# Patient Record
Sex: MALE | Race: WHITE | NOT HISPANIC OR LATINO | URBAN - METROPOLITAN AREA
[De-identification: names, ages, dates, MRNs, and addresses within clinical notes are randomized per-mention and may not be internally consistent; named-entity substitution may affect disease eponyms.]

---

## 2021-03-02 ENCOUNTER — TELEPHONE ENCOUNTER (OUTPATIENT)
Dept: URBAN - METROPOLITAN AREA CLINIC 23 | Facility: CLINIC | Age: 61
End: 2021-03-02

## 2021-04-05 ENCOUNTER — LAB OUTSIDE AN ENCOUNTER (OUTPATIENT)
Dept: URBAN - NONMETROPOLITAN AREA CLINIC 2 | Facility: CLINIC | Age: 61
End: 2021-04-05

## 2021-04-05 ENCOUNTER — OFFICE VISIT (OUTPATIENT)
Dept: URBAN - NONMETROPOLITAN AREA CLINIC 2 | Facility: CLINIC | Age: 61
End: 2021-04-05
Payer: COMMERCIAL

## 2021-04-05 VITALS
WEIGHT: 237 LBS | TEMPERATURE: 96.9 F | DIASTOLIC BLOOD PRESSURE: 94 MMHG | SYSTOLIC BLOOD PRESSURE: 157 MMHG | HEART RATE: 96 BPM | HEIGHT: 76 IN | BODY MASS INDEX: 28.86 KG/M2

## 2021-04-05 DIAGNOSIS — B19.20 HCV (HEPATITIS C VIRUS): ICD-10-CM

## 2021-04-05 DIAGNOSIS — R19.7 DIARRHEA, UNSPECIFIED TYPE: ICD-10-CM

## 2021-04-05 DIAGNOSIS — Z12.11 ROUTINE COLON: ICD-10-CM

## 2021-04-05 DIAGNOSIS — R19.5 OCCULT BLOOD IN STOOLS: ICD-10-CM

## 2021-04-05 DIAGNOSIS — K74.60 CIRRHOSIS: ICD-10-CM

## 2021-04-05 PROCEDURE — 99204 OFFICE O/P NEW MOD 45 MIN: CPT | Performed by: NURSE PRACTITIONER

## 2021-04-05 RX ORDER — LISINOPRIL AND HYDROCHLOROTHIAZIDE 20; 12.5 MG/1; MG/1
1 TABLET TABLET ORAL ONCE A DAY
Status: ACTIVE | COMMUNITY

## 2021-04-05 RX ORDER — DIPHENOXYLATE HYDROCHLORIDE AND ATROPINE SULFATE 2.5; .025 MG/1; MG/1
1 TABLET AS NEEDED TABLET ORAL
Status: ACTIVE | COMMUNITY

## 2021-04-05 RX ORDER — COLESTIPOL HYDROCHLORIDE 1 G/1
1 TABLET 30MINS PRIOR TO A MEAL TABLET, FILM COATED ORAL BID
Qty: 60 | Refills: 6 | OUTPATIENT
Start: 2021-04-05

## 2021-04-05 RX ORDER — AMLODIPINE BESYLATE 5 MG/1
TAKE 1 TABLET (5 MG) BY ORAL ROUTE ONCE DAILY TABLET ORAL 1
Qty: 0 | Refills: 0 | COMMUNITY
Start: 1900-01-01

## 2021-04-05 RX ORDER — POTASSIUM CHLORIDE 750 MG/1
1 TABLET WITH FOOD TABLET, FILM COATED, EXTENDED RELEASE ORAL TWICE A DAY
COMMUNITY

## 2021-04-05 NOTE — HPI-TODAY'S VISIT:
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 He was treated with 12 weeks of Eplusa in 2017. He did not f/u. He continues to drink 5-6 beers a night and more on the weekends along with liquor. He has been having daily diarrhea for the last several months. He denies any sick contacts  or new medications. His PCP gave him lomotil. This helped at first and now does not. He denies any overt bleeding. He was told his liver looked good on recent blood work. His blood counts were low however. He had a positive FIT test. His weight and appetite are stable. He denies any abdominal swelling, increase in bleeding, or mental changes. CS

## 2021-04-05 NOTE — HPI-OTHER HISTORIES
4/5/2017   Mr. Gloria is here for f/u of HCV Genotype 2 cirrhosis. He is treatment naive. Epclusa has been started and he is 6 weeks into treatment.  He had an EGD without varies. His colonoscopy showed a 8-9mm rectal TA polyp. He is well compensated. He has a few small presumed hemangiomas on US. These have been stable in size over the last several US and his AFP has been stable. He is due for HCC this month. He denies any abdominal swelling or mental changes. He bleeds easily but this is stable. He continues to drink beer. He has started itching about 2 weeks ago. He has a fine rash on his abdomen. CS

## 2021-04-06 ENCOUNTER — TELEPHONE ENCOUNTER (OUTPATIENT)
Dept: URBAN - NONMETROPOLITAN AREA CLINIC 2 | Facility: CLINIC | Age: 61
End: 2021-04-06

## 2021-04-07 LAB
AFP, SERUM, TUMOR MARKER: 7.9
ALBUMIN: 5.1
ALKALINE PHOSPHATASE: 74
ALT (SGPT): 23
AST (SGOT): 32
BILIRUBIN, DIRECT: 0.3
BILIRUBIN, TOTAL: 1.4
BUN/CREATININE RATIO: 11
BUN: 10
CARBON DIOXIDE, TOTAL: 22
CHLORIDE: 102
CREATININE: 0.87
EGFR IF AFRICN AM: 109
EGFR IF NONAFRICN AM: 94
GLUCOSE: 98
HCV LOG10: (no result)
HEMATOCRIT: 43.7
HEMATOLOGY COMMENTS:: (no result)
HEMOGLOBIN: 15.3
HEPATITIS C QUANTITATION: (no result)
INR: 1
MCH: 34.9
MCHC: 35
MCV: 100
NRBC: (no result)
PLATELETS: 70
POTASSIUM: 3.8
PROTEIN, TOTAL: 8.2
PROTHROMBIN TIME: 11
RBC: 4.38
RDW: 14.5
SODIUM: 142
TEST INFORMATION:: (no result)
WBC: 5.1

## 2021-04-08 ENCOUNTER — TELEPHONE ENCOUNTER (OUTPATIENT)
Dept: URBAN - NONMETROPOLITAN AREA CLINIC 2 | Facility: CLINIC | Age: 61
End: 2021-04-08

## 2021-04-08 RX ORDER — COLESTIPOL HYDROCHLORIDE 1 G/1
1 TABLET 30MINS PRIOR TO A MEAL TABLET, FILM COATED ORAL BID
Qty: 60 | Refills: 6
Start: 2021-04-05

## 2021-05-07 ENCOUNTER — OFFICE VISIT (OUTPATIENT)
Dept: URBAN - METROPOLITAN AREA MEDICAL CENTER 1 | Facility: MEDICAL CENTER | Age: 61
End: 2021-05-07
Payer: COMMERCIAL

## 2021-05-07 DIAGNOSIS — K29.30 CHRONIC SUPERFICIAL GASTRITIS: ICD-10-CM

## 2021-05-07 DIAGNOSIS — R19.7 ACUTE DIARRHEA: ICD-10-CM

## 2021-05-07 PROCEDURE — 45380 COLONOSCOPY AND BIOPSY: CPT | Performed by: INTERNAL MEDICINE

## 2021-05-07 PROCEDURE — 43239 EGD BIOPSY SINGLE/MULTIPLE: CPT | Performed by: INTERNAL MEDICINE

## 2021-05-24 ENCOUNTER — DASHBOARD ENCOUNTERS (OUTPATIENT)
Age: 61
End: 2021-05-24

## 2021-05-24 ENCOUNTER — OFFICE VISIT (OUTPATIENT)
Dept: URBAN - NONMETROPOLITAN AREA CLINIC 2 | Facility: CLINIC | Age: 61
End: 2021-05-24

## 2021-05-24 RX ORDER — DIPHENOXYLATE HYDROCHLORIDE AND ATROPINE SULFATE 2.5; .025 MG/1; MG/1
1 TABLET AS NEEDED TABLET ORAL
Status: ACTIVE | COMMUNITY

## 2021-05-24 RX ORDER — POTASSIUM CHLORIDE 750 MG/1
1 TABLET WITH FOOD TABLET, FILM COATED, EXTENDED RELEASE ORAL TWICE A DAY
COMMUNITY

## 2021-05-24 RX ORDER — COLESTIPOL HYDROCHLORIDE 1 G/1
1 TABLET 30MINS PRIOR TO A MEAL TABLET, FILM COATED ORAL BID
Qty: 60 | Refills: 6 | OUTPATIENT

## 2021-05-24 RX ORDER — COLESTIPOL HYDROCHLORIDE 1 G/1
1 TABLET 30MINS PRIOR TO A MEAL TABLET, FILM COATED ORAL BID
Qty: 60 | Refills: 6 | Status: ACTIVE | COMMUNITY
Start: 2021-04-05

## 2021-05-24 RX ORDER — AMLODIPINE BESYLATE 5 MG/1
TAKE 1 TABLET (5 MG) BY ORAL ROUTE ONCE DAILY TABLET ORAL 1
Qty: 0 | Refills: 0 | COMMUNITY
Start: 1900-01-01

## 2021-05-24 RX ORDER — LISINOPRIL AND HYDROCHLOROTHIAZIDE 20; 12.5 MG/1; MG/1
1 TABLET TABLET ORAL ONCE A DAY
Status: ACTIVE | COMMUNITY

## 2021-05-24 NOTE — HPI-TODAY'S VISIT:
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 He was treated with 12 weeks of Eplusa in 2017. He did not f/u. He continues to drink 5-6 beers a night and more on the weekends along with liquor. He has been having daily diarrhea for the last several months. He denies any sick contacts  or new medications. His PCP gave him lomotil. This helped at first and now does not. He denies any overt bleeding. He was told his liver looked good on recent blood work. His blood counts were low however. He had a positive FIT test. His weight and appetite are stable. He denies any abdominal swelling, increase in bleeding, or mental changes. CS

## 2021-05-24 NOTE — HPI-OTHER HISTORIES
4/5/2017   Mr. Gloria is here for f/u of HCV Genotype 2 cirrhosis. He is treatment naive. Epclusa has been started and he is 6 weeks into treatment.  He had an EGD without varies. His colonoscopy showed a 8-9mm rectal TA polyp. He is well compensated. He has a few small presumed hemangiomas on US. These have been stable in size over the last several US and his AFP has been stable. He is due for HCC this month. He denies any abdominal swelling or mental changes. He bleeds easily but this is stable. He continues to drink beer. He has started itching about 2 weeks ago. He has a fine rash on his abdomen.    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 He was treated with 12 weeks of Eplusa in 2017. He did not f/u. He continues to drink 5-6 beers a night and more on the weekends along with liquor. He has been having daily diarrhea for the last several months. He denies any sick contacts  or new medications. His PCP gave him lomotil. This helped at first and now does not. He denies any overt bleeding. He was told his liver looked good on recent blood work. His blood counts were low however. He had a positive FIT test. His weight and appetite are stable. He denies any abdominal swelling, increase in bleeding, or mental changes.